# Patient Record
(demographics unavailable — no encounter records)

---

## 2025-03-12 NOTE — ASSESSMENT
[FreeTextEntry1] : Patient's symptoms have worsened since 2023.  His bleeding and discomfort have increased.  He occasionally has bleeding through the clothing.  His hemorrhoids are too large for office-based therapy.  Indications, risks, benefits, alternatives for hemorrhoidectomy reviewed including but not limited to bleeding, infection, pain, tags, recurrence, fissure, fistula, stenosis, and incontinence.  All questions were answered.  The patient understands that if he has a significant a complication that requires emergent attention such as bleeding or infection then he will need to seek care locally rather than return to [place] for management.

## 2025-03-12 NOTE — PHYSICAL EXAM
[FreeTextEntry1] : Perianal inspection demonstrated large external tags with internal hemorrhoid prolapse.  Large internal hemorrhoids were noted on both digital exam and anoscopy.  Anoscopy performed to evaluate anal canal.  No sedation required.

## 2025-03-12 NOTE — HISTORY OF PRESENT ILLNESS
[FreeTextEntry1] : Gabe is a 41 y/o male being seen for a follow-up visit, hemorrhoids  Colonoscopy 4/17/23 (rectal bleeding) by Dr. Bonilla - Internal hemorrhoids that do not return to the anal canal, thus continuously prolapsed (Grade IV) found on perianal exam.  One 8 mm polyp in the sigmoid colon, removed.  One 4 mm polyp at the recto-sigmoid colon, removed.  The examined portion of the ileum was normal.    Last seen 3/2/23 - I have seen and evaluated patient and I have corroborated all nursing input into this note. Patient with large internal and external hemorrhoids associated with heavy bleeding. Unfortunately, the only treatment option for this degree of hemorrhoid pathology is surgery. Indications, risks, benefits, alternatives reviewed including but not limited to bleeding, infection, tags, pain, fissure, fistula, stenosis, incontinence, and recurrence. All questions answered. Although the patient's bleeding is most likely hemorrhoidal in origin, he is 40 years old and a full colorectal cancer screening exam should be performed. I recommended colonoscopy and reviewed indications, risks, benefits, alternatives including but not limited to bleeding, perforation, and incomplete exam. All questions were answered. Patient plans to schedule the colonoscopy in the next few weeks and possibly schedule surgery later in the year.   Today pt reports no pain. Daily 2 times BMs, formed, no routine use of fiber supplements/laxatives, does have some rectal discomfort when sitting, with bleeding dripping into bowl that occurs almost daily for a month, does have mucus leakage after BM daily, no episodes of incontinence, and does feel swollen tissue. Occasionally uses prep-H with little relief.  Not taking any anticoagulants.

## 2025-03-12 NOTE — HISTORY OF PRESENT ILLNESS
[FreeTextEntry1] : Gabe is a 43 y/o male being seen for a follow-up visit, hemorrhoids  Colonoscopy 4/17/23 (rectal bleeding) by Dr. Bonilla - Internal hemorrhoids that do not return to the anal canal, thus continuously prolapsed (Grade IV) found on perianal exam.  One 8 mm polyp in the sigmoid colon, removed.  One 4 mm polyp at the recto-sigmoid colon, removed.  The examined portion of the ileum was normal.    Last seen 3/2/23 - I have seen and evaluated patient and I have corroborated all nursing input into this note. Patient with large internal and external hemorrhoids associated with heavy bleeding. Unfortunately, the only treatment option for this degree of hemorrhoid pathology is surgery. Indications, risks, benefits, alternatives reviewed including but not limited to bleeding, infection, tags, pain, fissure, fistula, stenosis, incontinence, and recurrence. All questions answered. Although the patient's bleeding is most likely hemorrhoidal in origin, he is 40 years old and a full colorectal cancer screening exam should be performed. I recommended colonoscopy and reviewed indications, risks, benefits, alternatives including but not limited to bleeding, perforation, and incomplete exam. All questions were answered. Patient plans to schedule the colonoscopy in the next few weeks and possibly schedule surgery later in the year.   Today pt reports no pain. Daily 2 times BMs, formed, no routine use of fiber supplements/laxatives, does have some rectal discomfort when sitting, with bleeding dripping into bowl that occurs almost daily for a month, does have mucus leakage after BM daily, no episodes of incontinence, and does feel swollen tissue. Occasionally uses prep-H with little relief.  Not taking any anticoagulants.

## 2025-03-12 NOTE — ASSESSMENT
[FreeTextEntry1] : Patient's symptoms have worsened since 2023.  His bleeding and discomfort have increased.  He occasionally has bleeding through the clothing.  His hemorrhoids are too large for office-based therapy.  Indications, risks, benefits, alternatives for hemorrhoidectomy reviewed including but not limited to bleeding, infection, pain, tags, recurrence, fissure, fistula, stenosis, and incontinence.  All questions were answered.  The patient understands that if he has a significant a complication that requires emergent attention such as bleeding or infection then he will need to seek care locally rather than return to [place] for management.

## 2025-04-02 NOTE — HISTORY OF PRESENT ILLNESS
[FreeTextEntry1] : Gabe is a 43 y/o male being seen for a post-op visit, s/p Hemorrhoidectomy and mucosal proctoplasty on 3/14/25 for large external hemorrhoids, Internal hemorrhoids prolapse, and rectal bleeding  Colonoscopy 4/17/23 (rectal bleeding) by Dr. Bonilla - Internal hemorrhoids that do not return to the anal canal, thus continuously prolapsed (Grade IV) found on perianal exam. One 8 mm polyp in the sigmoid colon, removed. One 4 mm polyp at the recto-sigmoid colon, removed. The examined portion of the ileum was normal.  Today patient denies surgical incisional pain.  BMs soft 2-4 times daily, had some blood in stool.  Good appetite.  Denies nausea or vomiting.  No fever or chills.  Surgical incision sites with little blood drainage onto tp.   Have urgency.  No incontinence of stool or flatus.

## 2025-04-02 NOTE — ASSESSMENT
[FreeTextEntry1] : Normal wound healing.  Some urgency.  However, normal continence of gas and stool.  The patient's stool is less formed than it was prior to surgery.  Patient to return in 6 weeks.  Further management at that time dependent upon healing and bowel function over the next 6 weeks

## 2025-06-04 NOTE — HISTORY OF PRESENT ILLNESS
[FreeTextEntry1] : Gabe is a 41 y/o male being seen for a post-op visit, s/p Hemorrhoidectomy and mucosal proctoplasty on 3/14/25 for large external hemorrhoids, Internal hemorrhoids prolapse, and rectal bleeding. Pathology - 1. Hemorrhoids, right posterior, hemorrhoidectomy: - Skin and anorectal tissue with dilated blood vessels consistent with hemorrhoids. - Adjoining colonic tissue with mucosal prolapse associated changes. 2. Hemorrhoids, right anterior, hemorrhoidectomy: - Skin and anorectal tissue with dilated blood vessels and thrombosis consistent with hemorrhoids. - Adjoining colonic tissue with mucosal prolapse associated changes. 3. Hemorrhoids, left lateral, hemorrhoidectomy: - Skin and anorectal tissue with dilated blood vessels and thrombosis consistent with hemorrhoids. 4. Skin tag, excision: - Fibroepithelial polyp/skin tag.  Colonoscopy 4/17/23 (rectal bleeding) by Dr. Bonilla - Internal hemorrhoids that do not return to the anal canal, thus continuously prolapsed (Grade IV) found on perianal exam. One 8 mm polyp in the sigmoid colon, removed. One 4 mm polyp at the recto-sigmoid colon, removed. The examined portion of the ileum was normal.  Last seen on 4/2/25 - Normal wound healing. Some urgency. However, normal continence of gas and stool. The patient's stool is less formed than it was prior to surgery. Patient to return in 6 weeks. Further management at that time dependent upon healing and bowel function over the next 6 weeks.  Today pt reports no pain. Daily BMs, formed, no straining, no routine use of fiber supplements/laxatives, denies pain, no bleeding, no episodes of incontinence, and occasional feels swelling.  Xolair Pregnancy And Lactation Text: This medication is Pregnancy Category B and is considered safe during pregnancy. This medication is excreted in breast milk.

## 2025-06-04 NOTE — PHYSICAL EXAM
[FreeTextEntry1] : Perianal inspection digital exam and anoscopy demonstrated normal wound healing.  Anoscopy performed to evaluate anal canal.  No sedation required.

## 2025-06-04 NOTE — HISTORY OF PRESENT ILLNESS
[FreeTextEntry1] : Gabe is a 43 y/o male being seen for a post-op visit, s/p Hemorrhoidectomy and mucosal proctoplasty on 3/14/25 for large external hemorrhoids, Internal hemorrhoids prolapse, and rectal bleeding. Pathology - 1. Hemorrhoids, right posterior, hemorrhoidectomy: - Skin and anorectal tissue with dilated blood vessels consistent with hemorrhoids. - Adjoining colonic tissue with mucosal prolapse associated changes. 2. Hemorrhoids, right anterior, hemorrhoidectomy: - Skin and anorectal tissue with dilated blood vessels and thrombosis consistent with hemorrhoids. - Adjoining colonic tissue with mucosal prolapse associated changes. 3. Hemorrhoids, left lateral, hemorrhoidectomy: - Skin and anorectal tissue with dilated blood vessels and thrombosis consistent with hemorrhoids. 4. Skin tag, excision: - Fibroepithelial polyp/skin tag.  Colonoscopy 4/17/23 (rectal bleeding) by Dr. Bonilla - Internal hemorrhoids that do not return to the anal canal, thus continuously prolapsed (Grade IV) found on perianal exam. One 8 mm polyp in the sigmoid colon, removed. One 4 mm polyp at the recto-sigmoid colon, removed. The examined portion of the ileum was normal.  Last seen on 4/2/25 - Normal wound healing. Some urgency. However, normal continence of gas and stool. The patient's stool is less formed than it was prior to surgery. Patient to return in 6 weeks. Further management at that time dependent upon healing and bowel function over the next 6 weeks.  Today pt reports no pain. Daily BMs, formed, no straining, no routine use of fiber supplements/laxatives, denies pain, no bleeding, no episodes of incontinence, and occasional feels swelling.